# Patient Record
Sex: MALE | Race: ASIAN | URBAN - METROPOLITAN AREA
[De-identification: names, ages, dates, MRNs, and addresses within clinical notes are randomized per-mention and may not be internally consistent; named-entity substitution may affect disease eponyms.]

---
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
## 2025-02-15 ENCOUNTER — OFFICE VISIT (OUTPATIENT)
Dept: URGENT CARE | Facility: URGENT CARE | Age: 25
End: 2025-02-15
Payer: COMMERCIAL

## 2025-02-15 VITALS
TEMPERATURE: 97.4 F | OXYGEN SATURATION: 98 % | HEART RATE: 66 BPM | DIASTOLIC BLOOD PRESSURE: 75 MMHG | SYSTOLIC BLOOD PRESSURE: 111 MMHG | RESPIRATION RATE: 16 BRPM | WEIGHT: 168 LBS

## 2025-02-15 DIAGNOSIS — S06.9X0A MILD TRAUMATIC BRAIN INJURY, WITHOUT LOSS OF CONSCIOUSNESS, INITIAL ENCOUNTER (H): Primary | ICD-10-CM

## 2025-02-15 PROCEDURE — 99203 OFFICE O/P NEW LOW 30 MIN: CPT | Performed by: PHYSICIAN ASSISTANT
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
